# Patient Record
Sex: MALE | Race: WHITE | NOT HISPANIC OR LATINO | Employment: OTHER | URBAN - NONMETROPOLITAN AREA
[De-identification: names, ages, dates, MRNs, and addresses within clinical notes are randomized per-mention and may not be internally consistent; named-entity substitution may affect disease eponyms.]

---

## 2023-11-03 RX ORDER — ASPIRIN 81 MG/1
81 TABLET ORAL DAILY
COMMUNITY

## 2023-11-03 RX ORDER — ATORVASTATIN CALCIUM 80 MG/1
80 TABLET, FILM COATED ORAL DAILY
COMMUNITY
End: 2024-02-08 | Stop reason: ALTCHOICE

## 2023-11-03 RX ORDER — AMLODIPINE BESYLATE 10 MG/1
10 TABLET ORAL DAILY
COMMUNITY
End: 2024-02-08 | Stop reason: SDUPTHER

## 2023-11-03 RX ORDER — OMEPRAZOLE 20 MG/1
20 TABLET, DELAYED RELEASE ORAL
COMMUNITY

## 2023-11-03 RX ORDER — CYCLOBENZAPRINE HCL 10 MG
10 TABLET ORAL 3 TIMES DAILY PRN
COMMUNITY

## 2023-11-03 RX ORDER — TRAMADOL HYDROCHLORIDE 50 MG/1
50 TABLET ORAL EVERY 6 HOURS PRN
COMMUNITY

## 2023-11-03 RX ORDER — SULFASALAZINE 500 MG/1
1000 TABLET ORAL 4 TIMES DAILY
COMMUNITY

## 2023-11-03 RX ORDER — ALPRAZOLAM 0.25 MG/1
0.25 TABLET ORAL NIGHTLY PRN
COMMUNITY
End: 2023-11-06 | Stop reason: ALTCHOICE

## 2023-11-03 RX ORDER — METOPROLOL TARTRATE 25 MG/1
25 TABLET, FILM COATED ORAL 2 TIMES DAILY
COMMUNITY
End: 2024-01-04

## 2023-11-03 RX ORDER — DICYCLOMINE HYDROCHLORIDE 20 MG/1
20 TABLET ORAL
COMMUNITY

## 2023-11-03 RX ORDER — NITROGLYCERIN 0.4 MG/1
0.4 TABLET SUBLINGUAL EVERY 5 MIN PRN
COMMUNITY

## 2023-11-03 RX ORDER — IRBESARTAN 150 MG/1
150 TABLET ORAL NIGHTLY
COMMUNITY
End: 2024-02-08 | Stop reason: SDUPTHER

## 2023-11-03 RX ORDER — PREDNISONE 20 MG/1
20 TABLET ORAL DAILY
COMMUNITY

## 2023-11-03 RX ORDER — TAMSULOSIN HYDROCHLORIDE 0.4 MG/1
0.4 CAPSULE ORAL DAILY
COMMUNITY

## 2023-11-06 ENCOUNTER — OFFICE VISIT (OUTPATIENT)
Dept: CARDIOLOGY | Facility: CLINIC | Age: 63
End: 2023-11-06
Payer: MEDICARE

## 2023-11-06 VITALS
SYSTOLIC BLOOD PRESSURE: 134 MMHG | BODY MASS INDEX: 33.34 KG/M2 | HEART RATE: 58 BPM | DIASTOLIC BLOOD PRESSURE: 70 MMHG | HEIGHT: 68 IN | WEIGHT: 220 LBS

## 2023-11-06 DIAGNOSIS — Z98.890 S/P CAROTID ENDARTERECTOMY: ICD-10-CM

## 2023-11-06 DIAGNOSIS — I10 PRIMARY HYPERTENSION: ICD-10-CM

## 2023-11-06 DIAGNOSIS — E78.1 HYPERTRIGLYCERIDEMIA: ICD-10-CM

## 2023-11-06 DIAGNOSIS — I65.23 CAROTID STENOSIS, ASYMPTOMATIC, BILATERAL: ICD-10-CM

## 2023-11-06 DIAGNOSIS — I73.9 PVD (PERIPHERAL VASCULAR DISEASE) (CMS-HCC): ICD-10-CM

## 2023-11-06 DIAGNOSIS — I25.10 CORONARY ARTERY DISEASE, UNSPECIFIED VESSEL OR LESION TYPE, UNSPECIFIED WHETHER ANGINA PRESENT, UNSPECIFIED WHETHER NATIVE OR TRANSPLANTED HEART: Primary | ICD-10-CM

## 2023-11-06 DIAGNOSIS — Z95.5 HISTORY OF CORONARY ARTERY STENT PLACEMENT: ICD-10-CM

## 2023-11-06 DIAGNOSIS — E78.5 HYPERLIPIDEMIA, UNSPECIFIED HYPERLIPIDEMIA TYPE: ICD-10-CM

## 2023-11-06 PROBLEM — Z79.01 LONG TERM CURRENT USE OF ANTICOAGULANT THERAPY: Status: ACTIVE | Noted: 2023-11-06

## 2023-11-06 PROCEDURE — 3078F DIAST BP <80 MM HG: CPT | Performed by: INTERNAL MEDICINE

## 2023-11-06 PROCEDURE — 3075F SYST BP GE 130 - 139MM HG: CPT | Performed by: INTERNAL MEDICINE

## 2023-11-06 PROCEDURE — 99214 OFFICE O/P EST MOD 30 MIN: CPT | Performed by: INTERNAL MEDICINE

## 2023-11-06 NOTE — PROGRESS NOTES
This is a 6-month follow-up visit, in patient with atherosclerotic native vessel coronary artery disease, multivessel stenting, right carotid endarterectomy, inflammatory arthritis, and multiple risk factors which are detailed below.    Subjective :     Interval review of systems is negative for chest discomfort pressure tightness heaviness palpitations lightheadedness orthopnea paroxysmal nocturnal dyspnea dependent edema or claudication TIA or CVA type symptoms or bleeding diathesis    Remains quite active, goes hunting, and we discussed precautions  Nocturnal muscle cramps affecting both calf areas.  Denies claudication .      History so Far :  1. Symptoms concerning for class III angina pectoris, exertional in nature, however there is an increase in the frequency of symptoms, they are coming on with less activity.  2. Atherosclerotic native vessel coronary artery disease-PCI and stenting of the left anterior descending artery in the proximal mid segment was performed in 2014, patient was noted to be tubular, 3.5 x 28 mm Promus Premier stent was deployed.  3. PCI and stenting of the major obtuse marginal branch of the left circumflex artery was performed, 3.5 x 23 mm Xience expedition stent.  4. LVEF was normal in 2014 estimated LVEF 65% no mitral regurgitation or aortic stenosis  5. RCA was either nondominant or of mixed dominance, diffuse 50 to 70% disease in the proximal third 80 to 90% tubular disease in the mid third, distal RCA supplies a small amount of myocardium, in the distribution of the PDA, the actual PDA was felt to arise from the left circumflex system. RCA is being managed medically.  6. Hyperlipidemia  7. Status post right carotid endarterectomy  8. Glucose intolerance/prediabetes, hemoglobin A1c January 2023 6.0  9. Sinus bradycardia, incomplete right bundle branch block  10. Rheumatoid arthritis on Humira and as needed prednisone  11. Increased to BMI  12. Lexiscan Myoview 2017 was reported to  be normal  13. GERD, quiescent on prophylaxis.  14. Rheumatoid arthritis on as needed prednisone and Humira-chronic inflammatory disease  15. BPH, on tamsulosin.  16. Left heart catheterization February 2023-mixed dominance, large LAD that curves around the left ventricular apex, patent previous stent in the proximal LAD, with less than 20% narrowing, mid LAD 40% stenosis, several diagonal branches with less than 20% stenosis. Large codominant left circumflex artery moderate diffuse calcification healed plaque in the proximal vessel with eccentric 40 to 50% smooth stenosis first major obtuse marginal branch previously deployed stent in its mid segment, 99% stenosis starting at the distal end of the stent with SERGIO-3 distal flow, at the origin of this major obtuse marginal branch and immediately distal to it there is smooth 60% stenosis in the AV groove left circumflex artery distal to that distal branches and terminal circumflex have 20% stenosis RCA codominant proximal vessel 80% mid vessel 100% distal RCA fills by grade 3 collaterals from the left system LVEDP 5 mmHg no systolic gradient across the aortic valve, LVEF 50% no appreciable mitral regurgitation RCA occlusion is now total, prior angiography several years ago severe proximal vessel disease was noted.  17. Patient underwent PCI and stenting of the obtuse marginal branch of the left circumflex artery. 3.5 x 15 mm resolute [place] drug-eluting stent was deployed, and postdilated with a 3.5 mm noncompliant balloon. Angio-Seal hemostatic device was used.  18. Carotid ultrasound June 2023-no hemodynamically significant residual or recurrent stenosis is noted in the right internal carotid artery, 50 to 69% stenosis seen in the left extracranial internal carotid artery but closer to the lower end of the spectrum patent vertebral arteries with bilateral antegrade flow although the right vertebral artery waveform is blunted.  19.  Cannot rule out peripheral vascular  disease   20.  Nocturnal muscle cramps, differential diagnosis reviewed  21.  Ulcerative colitis    Objective      Wt Readings from Last 3 Encounters:   11/06/23 99.8 kg (220 lb)   06/12/23 101 kg (223 lb)   03/13/23 103 kg (226 lb)            Physical Exam:    GENERAL APPEARANCE: in no acute distress.  CHEST: Symmetric and non-tender.  INTEGUMENT: Skin warm and dry  HEENT: No gross abnormalities identified.No pallor or scleral icterus.  NECK: Supple, no JVD, no bruit.  Right carotid endarterectomy scar  NEURO/PSHCY: Alert and oriented x3; appropriate behavior and responses and responses  LUNGS: Clear to auscultation bilaterally; normal respiratory effort.  HEART: Rate and rhythm regular with no evident murmur; no gallop appreciated.   ABDOMEN: Soft, non tender.  MUSCULOSKELETAL: No gross deformities.  EXTREMITIES: Warm  There is no edema noted.  Dorsalis pedis is strong in the right foot posterior tibial and dorsalis pedis are probably 1+ out of 4 plus in both feet feet are cool to touch no dependent rubor    Meds:  Current Outpatient Medications   Medication Instructions    adalimumab (HUMIRA,CF, PEDI CROHNS STARTER SUBQ) subcutaneous, As directed    amLODIPine (NORVASC) 10 mg, oral, Daily    aspirin 81 mg, oral, Daily    atorvastatin (LIPITOR) 80 mg, oral, Daily    cyclobenzaprine (FLEXERIL) 10 mg, oral, 3 times daily PRN    dicyclomine (BENTYL) 20 mg, oral, 4 times daily before meals and nightly    irbesartan (AVAPRO) 150 mg, oral, Nightly    metoprolol tartrate (LOPRESSOR) 25 mg, oral, 2 times daily    nitroglycerin (NITROSTAT) 0.4 mg, sublingual, Every 5 min PRN    omeprazole OTC (PRILOSEC OTC) 20 mg, oral, 2 times daily before meals, Do not crush, chew, or split.    predniSONE (DELTASONE) 20 mg, oral, Daily, As directed    sulfaSALAzine (AZULFIDINE) 1,000 mg, oral, 4 times daily    tamsulosin (FLOMAX) 0.4 mg, oral, Daily    ticagrelor (BRILINTA) 90 mg, oral, 2 times daily    traMADol (ULTRAM) 50 mg, oral,  Every 6 hours PRN          No Known Allergies    Reviewed CBC lipid profile TSH basic metabolic profile and comprehensive profile from July 2023.  Reviewed hemoglobin A1c which is 6.2, no symptoms of hypoglycemia.  Triglycerides are elevated at 322 LDL 79 GFR greater than 60 glucose 127 potassium 4.4 liver enzymes are normal      Problem List:    Patient Active Problem List    Diagnosis Date Noted    Primary hypertension 11/06/2023    CAD (coronary artery disease) 11/06/2023    Hyperlipemia 11/06/2023    Hypertriglyceridemia 11/06/2023    Long term current use of anticoagulant therapy 11/06/2023    S/P carotid endarterectomy 11/06/2023    History of coronary artery stent placement 11/06/2023                 Assessment:  1.Primary hypertension-at target  2.  Hyperlipidemia-now characterized by hypertriglyceridemia  3.  Diabetes mellitus-hemoglobin A1c excellent, no symptoms of hypoglycemia  4.  History of multivessel PCI and stenting, current use of antiplatelet therapy, patient does complain of excessive bruising, I would like however for him to stay on dual antiplatelet therapy at this time based on his coronary anatomy hemoglobin hematocrit and platelets are normal  5.  He was supposed to get a carotid ultrasound last visit, I do not believe it was done.  6.  Patient is not complaining of angina or anginal equivalent, his shortness of breath is also much improved.  7.  Some evidence of peripheral vascular disease on examination, patient with diabetes and vascular disease elsewhere  8.  Nocturnal muscle cramps differential diagnosis includes peripheral vascular disease.  9.  Ulcerative colitis-quiescent  10.  Patient is taking 20 mg of prednisone as needed aches and pains, defer to primary       Recommendations:  1.  Suggested that patient drink 2 to 3 ounces of tonic water prior to bedtime  2.  PVR rest and exercise  3.  Carotid ultrasound  4.  Fish oil 1000 mg p.o. twice daily  5.  Follow-up in 3 months or sooner  if interval problems arise  6.  Stay on dual antiplatelet therapy for now  .      Follow up : 3 months      Dorothy Merchant MD

## 2023-11-18 ENCOUNTER — HOSPITAL ENCOUNTER (OUTPATIENT)
Dept: CARDIOLOGY | Facility: CLINIC | Age: 63
Discharge: HOME | End: 2023-11-18
Payer: MEDICARE

## 2023-11-18 DIAGNOSIS — Z98.890 S/P CAROTID ENDARTERECTOMY: ICD-10-CM

## 2023-11-18 DIAGNOSIS — I65.23 CAROTID STENOSIS, ASYMPTOMATIC, BILATERAL: ICD-10-CM

## 2023-11-18 PROCEDURE — 93880 EXTRACRANIAL BILAT STUDY: CPT | Performed by: INTERNAL MEDICINE

## 2023-11-18 PROCEDURE — 93880 EXTRACRANIAL BILAT STUDY: CPT

## 2023-11-30 ENCOUNTER — CLINICAL SUPPORT (OUTPATIENT)
Dept: CARDIOLOGY | Facility: CLINIC | Age: 63
End: 2023-11-30
Payer: MEDICARE

## 2023-11-30 DIAGNOSIS — I73.9 PVD (PERIPHERAL VASCULAR DISEASE) (CMS-HCC): ICD-10-CM

## 2023-11-30 NOTE — PROGRESS NOTES
Results:  Right Brachial 0.74 DP 0.83 PT 0.91  Left Brachial 1.00 DP 1.01 PT 0.86    SYMPTOM    1.  Do you ever have pain, tightness, tiredness or burning in the buttocks, hips, thighs, calves or feet while walking or exercising? NO      2.  Do you ever experience heaviness, or weakness with standing or walking? YES    3.  Have you ever experienced sores or ulcers on your feet or legs which seem to heal slowly? NO    4.  Do you ever appear limited in the distance you can walk due to leg weakness, tiredness, pain or burning?  YES    5.  Do you ever experience coldness in your legs or in your feet? [YES    6.  Do you ever experience pain, cramping or discomfort in your legs, feet or toes at night? YES    7.  Have you experienced hair loss on your feet or legs?  NO    8.  Inability to take blood pressure in your arm or leg? NO

## 2023-12-07 ENCOUNTER — APPOINTMENT (OUTPATIENT)
Dept: CARDIOLOGY | Facility: CLINIC | Age: 63
End: 2023-12-07
Payer: MEDICARE

## 2024-01-10 DIAGNOSIS — I25.10 CORONARY ARTERY DISEASE, UNSPECIFIED VESSEL OR LESION TYPE, UNSPECIFIED WHETHER ANGINA PRESENT, UNSPECIFIED WHETHER NATIVE OR TRANSPLANTED HEART: ICD-10-CM

## 2024-01-11 RX ORDER — TICAGRELOR 90 MG/1
90 TABLET ORAL 2 TIMES DAILY
Qty: 180 TABLET | Refills: 0 | Status: SHIPPED | OUTPATIENT
Start: 2024-01-11 | End: 2024-02-08 | Stop reason: SDUPTHER

## 2024-02-08 ENCOUNTER — OFFICE VISIT (OUTPATIENT)
Dept: CARDIOLOGY | Facility: CLINIC | Age: 64
End: 2024-02-08
Payer: MEDICARE

## 2024-02-08 VITALS
HEIGHT: 66 IN | WEIGHT: 220.4 LBS | DIASTOLIC BLOOD PRESSURE: 90 MMHG | HEART RATE: 56 BPM | SYSTOLIC BLOOD PRESSURE: 110 MMHG | BODY MASS INDEX: 35.42 KG/M2

## 2024-02-08 DIAGNOSIS — Z95.5 HISTORY OF CORONARY ARTERY STENT PLACEMENT: ICD-10-CM

## 2024-02-08 DIAGNOSIS — Z48.812 POSTOP CAROTID ENDARTERECTOMY SURVEILLANCE, ENCOUNTER FOR: ICD-10-CM

## 2024-02-08 DIAGNOSIS — I10 PRIMARY HYPERTENSION: ICD-10-CM

## 2024-02-08 DIAGNOSIS — I25.10 CORONARY ARTERY DISEASE, UNSPECIFIED VESSEL OR LESION TYPE, UNSPECIFIED WHETHER ANGINA PRESENT, UNSPECIFIED WHETHER NATIVE OR TRANSPLANTED HEART: ICD-10-CM

## 2024-02-08 DIAGNOSIS — E78.1 HYPERTRIGLYCERIDEMIA: ICD-10-CM

## 2024-02-08 DIAGNOSIS — Z98.890 S/P CAROTID ENDARTERECTOMY: Primary | ICD-10-CM

## 2024-02-08 DIAGNOSIS — I77.9 BILATERAL CAROTID ARTERY DISEASE, UNSPECIFIED TYPE (CMS-HCC): ICD-10-CM

## 2024-02-08 PROBLEM — Z71.2 ENCOUNTER TO DISCUSS TEST RESULTS: Status: ACTIVE | Noted: 2024-02-08

## 2024-02-08 PROCEDURE — 99214 OFFICE O/P EST MOD 30 MIN: CPT | Performed by: INTERNAL MEDICINE

## 2024-02-08 PROCEDURE — 3080F DIAST BP >= 90 MM HG: CPT | Performed by: INTERNAL MEDICINE

## 2024-02-08 PROCEDURE — 3074F SYST BP LT 130 MM HG: CPT | Performed by: INTERNAL MEDICINE

## 2024-02-08 PROCEDURE — 1036F TOBACCO NON-USER: CPT | Performed by: INTERNAL MEDICINE

## 2024-02-08 RX ORDER — ROSUVASTATIN CALCIUM 40 MG/1
40 TABLET, COATED ORAL DAILY
Qty: 90 TABLET | Refills: 2 | Status: SHIPPED | OUTPATIENT
Start: 2024-02-08 | End: 2025-02-07

## 2024-02-08 RX ORDER — METOPROLOL TARTRATE 25 MG/1
25 TABLET, FILM COATED ORAL 2 TIMES DAILY
Qty: 180 TABLET | Refills: 2 | Status: SHIPPED | OUTPATIENT
Start: 2024-02-08 | End: 2025-02-07

## 2024-02-08 RX ORDER — IRBESARTAN 150 MG/1
150 TABLET ORAL NIGHTLY
Qty: 90 TABLET | Refills: 2 | Status: SHIPPED | OUTPATIENT
Start: 2024-02-08 | End: 2025-02-07

## 2024-02-08 RX ORDER — AMLODIPINE BESYLATE 10 MG/1
10 TABLET ORAL DAILY
Qty: 90 TABLET | Refills: 2 | Status: SHIPPED | OUTPATIENT
Start: 2024-02-08 | End: 2025-02-07

## 2024-02-08 NOTE — LETTER
February 8, 2024     Julio Cesar Casillas DO  [address] [facility] [address]    Patient: Joby Rodrigues   YOB: 1960   Date of Visit: 2/8/2024       Dear Dr. Julio Cesar Casillas DO:    Thank you for referring Joby Rodrigues to me for evaluation. Below are my notes for this consultation.  If you have questions, please do not hesitate to call me. I look forward to following your patient along with you.       Sincerely,     Dorothy Merchant MD      CC: No Recipients  ______________________________________________________________________________________    63-year-old with multiple cardiac risk factors and coronary artery disease and cerebrovascular disease, was most recently seen in November 2023 and presents for follow-up.  Since his last visit he has not had evaluation for peripheral vascular disease as well as carotid ultrasound.    Subjective :   No chest pressure tightness heaviness or palpitations  Wants to know if he can go off the Brilinta.  Just completed 1 year post PCI and TOSHA  Has extensive atherosclerotic coronary artery disease  Complains of diffuse aches and pains, differential diagnosis does include side effect or myalgia related to atorvastatin.  Patient is also on prednisone which helps.  No muscle tenderness  No falls  Did not bring medications bottles or list.        History so Far :  1. Symptoms concerning for class III angina pectoris, exertional in nature, however there is an increase in the frequency of symptoms, they are coming on with less activity.  2. Atherosclerotic native vessel coronary artery disease-PCI and stenting of the left anterior descending artery in the proximal mid segment was performed in 2014, patient was noted to be tubular, 3.5 x 28 mm Promus Premier stent was deployed.  3. PCI and stenting of the major obtuse marginal branch of the left circumflex artery was performed, 3.5 x 23 mm Xience expedition stent.  4. LVEF was normal in 2014 estimated LVEF 65% no  mitral regurgitation or aortic stenosis  5. RCA was either nondominant or of mixed dominance, diffuse 50 to 70% disease in the proximal third 80 to 90% tubular disease in the mid third, distal RCA supplies a small amount of myocardium, in the distribution of the PDA, the actual PDA was felt to arise from the left circumflex system. RCA is being managed medically.  6. Hyperlipidemia  7. Status post right carotid endarterectomy  8. Glucose intolerance/prediabetes, hemoglobin A1c January 2023 6.0  9. Sinus bradycardia, incomplete right bundle branch block  10. Rheumatoid arthritis on Humira and as needed prednisone  11. Increased to BMI  12. Lexiscan Myoview 2017 was reported to be normal  13. GERD, quiescent on prophylaxis.  14. Rheumatoid arthritis on as needed prednisone and Humira-chronic inflammatory disease  15. BPH, on tamsulosin.  16. Left heart catheterization February 2023-mixed dominance, large LAD that curves around the left ventricular apex, patent previous stent in the proximal LAD, with less than 20% narrowing, mid LAD 40% stenosis, several diagonal branches with less than 20% stenosis. Large codominant left circumflex artery moderate diffuse calcification healed plaque in the proximal vessel with eccentric 40 to 50% smooth stenosis first major obtuse marginal branch previously deployed stent in its mid segment, 99% stenosis starting at the distal end of the stent with SERGIO-3 distal flow, at the origin of this major obtuse marginal branch and immediately distal to it there is smooth 60% stenosis in the AV groove left circumflex artery distal to that distal branches and terminal circumflex have 20% stenosis RCA codominant proximal vessel 80% mid vessel 100% distal RCA fills by grade 3 collaterals from the left system LVEDP 5 mmHg no systolic gradient across the aortic valve, LVEF 50% no appreciable mitral regurgitation RCA occlusion is now total, prior angiography several years ago severe proximal vessel  "disease was noted.  17. Patient underwent PCI and stenting of the obtuse marginal branch of the left circumflex artery. 3.5 x 15 mm resolute [place] drug-eluting stent was deployed, and postdilated with a 3.5 mm noncompliant balloon. Angio-Seal hemostatic device was used.  18. Carotid ultrasound June 2023-no hemodynamically significant residual or recurrent stenosis is noted in the right internal carotid artery, 50 to 69% stenosis seen in the left extracranial internal carotid artery but closer to the lower end of the spectrum patent vertebral arteries with bilateral antegrade flow although the right vertebral artery waveform is blunted.  19.  Cannot rule out peripheral vascular disease however KYM November 2023-right DP 0.83 right PT 0.91 left PT 0.86 left DP 1.01   20.  Nocturnal muscle cramps, differential diagnosis reviewed, not complaining much of muscle cramping today  21.  Ulcerative colitis  13.  Carotid ultrasound 11/6/2023-50 to 69% stenosis right proximal internal carotid artery less than 50% stenosis left proximal internal carotid artery bilateral antegrade vertebral flow patent right carotid endarterectomy site following endarterectomy    Objective   Failed to redirect to the Timeline version of the [employer] SmartLink.   Wt Readings from Last 3 Encounters:   02/08/24 100 kg (220 lb 6.4 oz)   11/06/23 99.8 kg (220 lb)   06/12/23 101 kg (223 lb)        Visit Vitals  /90 (BP Location: Left arm, Patient Position: Sitting)   Pulse 56   Ht 1.676 m (5' 6\")   Wt 100 kg (220 lb 6.4 oz)   BMI 35.57 kg/m²   Smoking Status Former   BSA 2.16 m²            Physical Exam:    GENERAL APPEARANCE: in no acute distress.  CHEST: Symmetric and non-tender.  INTEGUMENT: Skin warm and dry  HEENT: No gross abnormalities identified.No pallor or scleral icterus.  NECK: Supple, no JVD, no bruit.  Well-healed right Carotid endarterectomy scar  NEURO/PSHCY: Alert and oriented x3; appropriate behavior and responses and " responses  LUNGS: Clear to auscultation bilaterally; normal respiratory effort.  HEART: Rate and rhythm regular with no evident murmur; no gallop appreciated.   ABDOMEN: Soft, non tender.  MUSCULOSKELETAL: No gross deformities.  EXTREMITIES: Warm  There is no edema noted.    Meds:  Current Outpatient Medications   Medication Instructions   • adalimumab (HUMIRA,CF, PEDI CROHNS STARTER SUBQ) subcutaneous, As directed   • amLODIPine (NORVASC) 10 mg, oral, Daily   • aspirin 81 mg, oral, Daily   • atorvastatin (LIPITOR) 80 mg, oral, Daily   • Brilinta 90 mg, oral, 2 times daily   • cyclobenzaprine (FLEXERIL) 10 mg, oral, 3 times daily PRN   • dicyclomine (BENTYL) 20 mg, oral, 4 times daily before meals and nightly   • irbesartan (AVAPRO) 150 mg, oral, Nightly   • metoprolol tartrate (LOPRESSOR) 25 mg, oral, 2 times daily   • nitroglycerin (NITROSTAT) 0.4 mg, sublingual, Every 5 min PRN   • omeprazole OTC (PRILOSEC OTC) 20 mg, oral, 2 times daily before meals, Do not crush, chew, or split.   • predniSONE (DELTASONE) 20 mg, oral, Daily, As needed   • sulfaSALAzine (AZULFIDINE) 1,000 mg, oral, 4 times daily   • tamsulosin (FLOMAX) 0.4 mg, oral, Daily   • traMADol (ULTRAM) 50 mg, oral, Every 6 hours PRN          No Known Allergies    LABS:    Reviewed laboratory data to include CBC, comprehensive profile, basic metabolic profile, hemoglobin A1c and lipid profile from January 2024.  GFR is greater than 60 CBC is within normal limits TSH is normal at 3.11, HDL is 36,   , Total cholesterol 180.    Patient Active Problem List    Diagnosis Date Noted   • Encounter to discuss test results 02/08/2024   • Bilateral carotid artery disease (CMS/HCC) 02/08/2024   • Primary hypertension 11/06/2023   • CAD (coronary artery disease) 11/06/2023   • Hyperlipemia 11/06/2023   • Hypertriglyceridemia 11/06/2023   • Long term current use of anticoagulant therapy 11/06/2023   • S/P carotid endarterectomy 11/06/2023   • History of coronary  artery stent placement 11/06/2023                 Assessment:    1. S/P carotid endarterectomy        2. Coronary artery disease, unspecified vessel or lesion type, unspecified whether angina present, unspecified whether native or transplanted heart  Follow Up In Cardiology      3. Primary hypertension        4. Hypertriglyceridemia        5. History of coronary artery stent placement        6. Bilateral carotid artery disease, unspecified type (CMS/HCC)        Clinical discussion:  Patient is without angina or anginal equivalent  Blood pressure is at target  Lipids could be better, LDL goal is 70 or below, currently on maximum dose atorvastatin  There is a question as to  whether atorvastatin is causing some of his myalgia type symptoms  Reviewed results of carotid ultrasound and evaluation of PVD    Recommendations:  1.  Atorvastatin holiday for a week, patient should report if his myalgia improves tremendously, in which case we will document intolerance to the medicine  2.  Continue dual antiplatelet therapy for now  3.  After a week, switch to rosuvastatin 40 mg daily, if there is intolerance to rosuvastatin then we have to switch to Leqvio  4.  Follow-up with me in 6 months  5.  Lipid profile and liver enzymes in 3 to 4 months  6.  Patient was reminded that he should be bringing in his medication bottles or accurate medication list at every visit in order to get refills.  Follow up : 6 months        Provider Attestation - Scribe documentation    All medical record entries made by the Scribe were at my direction and personally dictated by me. I have reviewed the chart and agree that the record accurately reflects my personal performance of the history, physical exam, discussion and plan.     Scribe Attestation  By signing my name below, IRhianna LPN, Scribe   attest that this documentation has been prepared under the direction and in the presence of Dorothy Merchant MD.

## 2024-02-08 NOTE — PROGRESS NOTES
63-year-old with multiple cardiac risk factors and coronary artery disease and cerebrovascular disease, was most recently seen in November 2023 and presents for follow-up.  Since his last visit he has not had evaluation for peripheral vascular disease as well as carotid ultrasound.    Subjective :   No chest pressure tightness heaviness or palpitations  Wants to know if he can go off the Brilinta.  Just completed 1 year post PCI and TOSHA  Has extensive atherosclerotic coronary artery disease  Complains of diffuse aches and pains, differential diagnosis does include side effect or myalgia related to atorvastatin.  Patient is also on prednisone which helps.  No muscle tenderness  No falls  Did not bring medications bottles or list.        History so Far :  1. Symptoms concerning for class III angina pectoris, exertional in nature, however there is an increase in the frequency of symptoms, they are coming on with less activity.  2. Atherosclerotic native vessel coronary artery disease-PCI and stenting of the left anterior descending artery in the proximal mid segment was performed in 2014, patient was noted to be tubular, 3.5 x 28 mm Promus Premier stent was deployed.  3. PCI and stenting of the major obtuse marginal branch of the left circumflex artery was performed, 3.5 x 23 mm Xience expedition stent.  4. LVEF was normal in 2014 estimated LVEF 65% no mitral regurgitation or aortic stenosis  5. RCA was either nondominant or of mixed dominance, diffuse 50 to 70% disease in the proximal third 80 to 90% tubular disease in the mid third, distal RCA supplies a small amount of myocardium, in the distribution of the PDA, the actual PDA was felt to arise from the left circumflex system. RCA is being managed medically.  6. Hyperlipidemia  7. Status post right carotid endarterectomy  8. Glucose intolerance/prediabetes, hemoglobin A1c January 2023 6.0  9. Sinus bradycardia, incomplete right bundle branch block  10. Rheumatoid  arthritis on Humira and as needed prednisone  11. Increased to BMI  12. Lexiscan Myoview 2017 was reported to be normal  13. GERD, quiescent on prophylaxis.  14. Rheumatoid arthritis on as needed prednisone and Humira-chronic inflammatory disease  15. BPH, on tamsulosin.  16. Left heart catheterization February 2023-mixed dominance, large LAD that curves around the left ventricular apex, patent previous stent in the proximal LAD, with less than 20% narrowing, mid LAD 40% stenosis, several diagonal branches with less than 20% stenosis. Large codominant left circumflex artery moderate diffuse calcification healed plaque in the proximal vessel with eccentric 40 to 50% smooth stenosis first major obtuse marginal branch previously deployed stent in its mid segment, 99% stenosis starting at the distal end of the stent with SERGIO-3 distal flow, at the origin of this major obtuse marginal branch and immediately distal to it there is smooth 60% stenosis in the AV groove left circumflex artery distal to that distal branches and terminal circumflex have 20% stenosis RCA codominant proximal vessel 80% mid vessel 100% distal RCA fills by grade 3 collaterals from the left system LVEDP 5 mmHg no systolic gradient across the aortic valve, LVEF 50% no appreciable mitral regurgitation RCA occlusion is now total, prior angiography several years ago severe proximal vessel disease was noted.  17. Patient underwent PCI and stenting of the obtuse marginal branch of the left circumflex artery. 3.5 x 15 mm resolute [place] drug-eluting stent was deployed, and postdilated with a 3.5 mm noncompliant balloon. Angio-Seal hemostatic device was used.  18. Carotid ultrasound June 2023-no hemodynamically significant residual or recurrent stenosis is noted in the right internal carotid artery, 50 to 69% stenosis seen in the left extracranial internal carotid artery but closer to the lower end of the spectrum patent vertebral arteries with bilateral  "antegrade flow although the right vertebral artery waveform is blunted.  19.  Cannot rule out peripheral vascular disease however KYM November 2023-right DP 0.83 right PT 0.91 left PT 0.86 left DP 1.01   20.  Nocturnal muscle cramps, differential diagnosis reviewed, not complaining much of muscle cramping today  21.  Ulcerative colitis  13.  Carotid ultrasound 11/6/2023-50 to 69% stenosis right proximal internal carotid artery less than 50% stenosis left proximal internal carotid artery bilateral antegrade vertebral flow patent right carotid endarterectomy site following endarterectomy    Objective   Failed to redirect to the Timeline version of the REVFS SmartLink.   Wt Readings from Last 3 Encounters:   02/08/24 100 kg (220 lb 6.4 oz)   11/06/23 99.8 kg (220 lb)   06/12/23 101 kg (223 lb)        Visit Vitals  /90 (BP Location: Left arm, Patient Position: Sitting)   Pulse 56   Ht 1.676 m (5' 6\")   Wt 100 kg (220 lb 6.4 oz)   BMI 35.57 kg/m²   Smoking Status Former   BSA 2.16 m²            Physical Exam:    GENERAL APPEARANCE: in no acute distress.  CHEST: Symmetric and non-tender.  INTEGUMENT: Skin warm and dry  HEENT: No gross abnormalities identified.No pallor or scleral icterus.  NECK: Supple, no JVD, no bruit.  Well-healed right Carotid endarterectomy scar  NEURO/PSHCY: Alert and oriented x3; appropriate behavior and responses and responses  LUNGS: Clear to auscultation bilaterally; normal respiratory effort.  HEART: Rate and rhythm regular with no evident murmur; no gallop appreciated.   ABDOMEN: Soft, non tender.  MUSCULOSKELETAL: No gross deformities.  EXTREMITIES: Warm  There is no edema noted.    Meds:  Current Outpatient Medications   Medication Instructions    adalimumab (HUMIRA,CF, PEDI CROHNS STARTER SUBQ) subcutaneous, As directed    amLODIPine (NORVASC) 10 mg, oral, Daily    aspirin 81 mg, oral, Daily    atorvastatin (LIPITOR) 80 mg, oral, Daily    Brilinta 90 mg, oral, 2 times daily    " cyclobenzaprine (FLEXERIL) 10 mg, oral, 3 times daily PRN    dicyclomine (BENTYL) 20 mg, oral, 4 times daily before meals and nightly    irbesartan (AVAPRO) 150 mg, oral, Nightly    metoprolol tartrate (LOPRESSOR) 25 mg, oral, 2 times daily    nitroglycerin (NITROSTAT) 0.4 mg, sublingual, Every 5 min PRN    omeprazole OTC (PRILOSEC OTC) 20 mg, oral, 2 times daily before meals, Do not crush, chew, or split.    predniSONE (DELTASONE) 20 mg, oral, Daily, As needed    sulfaSALAzine (AZULFIDINE) 1,000 mg, oral, 4 times daily    tamsulosin (FLOMAX) 0.4 mg, oral, Daily    traMADol (ULTRAM) 50 mg, oral, Every 6 hours PRN          No Known Allergies    LABS:    Reviewed laboratory data to include CBC, comprehensive profile, basic metabolic profile, hemoglobin A1c and lipid profile from January 2024.  GFR is greater than 60 CBC is within normal limits TSH is normal at 3.11, HDL is 36,   , Total cholesterol 180.    Patient Active Problem List    Diagnosis Date Noted    Encounter to discuss test results 02/08/2024    Bilateral carotid artery disease (CMS/[facility]) 02/08/2024    Primary hypertension 11/06/2023    CAD (coronary artery disease) 11/06/2023    Hyperlipemia 11/06/2023    Hypertriglyceridemia 11/06/2023    Long term current use of anticoagulant therapy 11/06/2023    S/P carotid endarterectomy 11/06/2023    History of coronary artery stent placement 11/06/2023                 Assessment:    1. S/P carotid endarterectomy        2. Coronary artery disease, unspecified vessel or lesion type, unspecified whether angina present, unspecified whether native or transplanted heart  Follow Up In Cardiology      3. Primary hypertension        4. Hypertriglyceridemia        5. History of coronary artery stent placement        6. Bilateral carotid artery disease, unspecified type (CMS/[facility])        Clinical discussion:  Patient is without angina or anginal equivalent  Blood pressure is at target  Lipids could be better, LDL goal is  70 or below, currently on maximum dose atorvastatin  There is a question as to  whether atorvastatin is causing some of his myalgia type symptoms  Reviewed results of carotid ultrasound and evaluation of PVD    Recommendations:  1.  Atorvastatin holiday for a week, patient should report if his myalgia improves tremendously, in which case we will document intolerance to the medicine  2.  Continue dual antiplatelet therapy for now  3.  After a week, switch to rosuvastatin 40 mg daily, if there is intolerance to rosuvastatin then we have to switch to Leqvio  4.  Follow-up with me in 6 months  5.  Lipid profile and liver enzymes in 3 to 4 months  6.  Patient was reminded that he should be bringing in his medication bottles or accurate medication list at every visit in order to get refills.  Follow up : 6 months        Provider Attestation - Scribe documentation    All medical record entries made by the Scribe were at my direction and personally dictated by me. I have reviewed the chart and agree that the record accurately reflects my personal performance of the history, physical exam, discussion and plan.     Scribe Attestation  By signing my name below, I, Erika Moctezuma LPN   attest that this documentation has been prepared under the direction and in the presence of Dorothy Merchant MD.

## 2024-08-13 DIAGNOSIS — I10 PRIMARY HYPERTENSION: ICD-10-CM

## 2024-08-13 RX ORDER — IRBESARTAN 150 MG/1
150 TABLET ORAL NIGHTLY
Qty: 90 TABLET | Refills: 0 | Status: SHIPPED | OUTPATIENT
Start: 2024-08-13 | End: 2024-11-11

## 2024-08-19 ENCOUNTER — APPOINTMENT (OUTPATIENT)
Dept: CARDIOLOGY | Facility: CLINIC | Age: 64
End: 2024-08-19
Payer: MEDICARE

## 2024-09-16 ENCOUNTER — HOSPITAL ENCOUNTER (OUTPATIENT)
Dept: CARDIOLOGY | Facility: CLINIC | Age: 64
Discharge: HOME | End: 2024-09-16
Payer: MEDICARE

## 2024-09-16 DIAGNOSIS — Z98.890 S/P CAROTID ENDARTERECTOMY: ICD-10-CM

## 2024-09-16 DIAGNOSIS — I25.10 CORONARY ARTERY DISEASE, UNSPECIFIED VESSEL OR LESION TYPE, UNSPECIFIED WHETHER ANGINA PRESENT, UNSPECIFIED WHETHER NATIVE OR TRANSPLANTED HEART: ICD-10-CM

## 2024-09-16 DIAGNOSIS — Z48.812 POSTOP CAROTID ENDARTERECTOMY SURVEILLANCE, ENCOUNTER FOR: ICD-10-CM

## 2024-09-16 DIAGNOSIS — I65.23 OCCLUSION AND STENOSIS OF BILATERAL CAROTID ARTERIES: ICD-10-CM

## 2024-09-16 DIAGNOSIS — I77.9 BILATERAL CAROTID ARTERY DISEASE, UNSPECIFIED TYPE (CMS-HCC): ICD-10-CM

## 2024-09-16 DIAGNOSIS — Z95.5 HISTORY OF CORONARY ARTERY STENT PLACEMENT: ICD-10-CM

## 2024-09-16 PROCEDURE — 93880 EXTRACRANIAL BILAT STUDY: CPT

## 2024-09-16 PROCEDURE — 93880 EXTRACRANIAL BILAT STUDY: CPT | Performed by: INTERNAL MEDICINE

## 2024-09-19 ENCOUNTER — APPOINTMENT (OUTPATIENT)
Dept: CARDIOLOGY | Facility: CLINIC | Age: 64
End: 2024-09-19
Payer: MEDICARE

## 2024-09-19 VITALS
WEIGHT: 221 LBS | BODY MASS INDEX: 33.49 KG/M2 | SYSTOLIC BLOOD PRESSURE: 118 MMHG | HEART RATE: 60 BPM | DIASTOLIC BLOOD PRESSURE: 70 MMHG | HEIGHT: 68 IN

## 2024-09-19 DIAGNOSIS — D64.89 ANEMIA DUE TO OTHER CAUSE, NOT CLASSIFIED: ICD-10-CM

## 2024-09-19 DIAGNOSIS — Z79.01 LONG TERM CURRENT USE OF ANTICOAGULANT THERAPY: ICD-10-CM

## 2024-09-19 DIAGNOSIS — I77.9 BILATERAL CAROTID ARTERY DISEASE, UNSPECIFIED TYPE (CMS-HCC): ICD-10-CM

## 2024-09-19 DIAGNOSIS — Z87.891 FORMER SMOKER: ICD-10-CM

## 2024-09-19 DIAGNOSIS — I10 PRIMARY HYPERTENSION: ICD-10-CM

## 2024-09-19 DIAGNOSIS — Z95.5 HISTORY OF CORONARY ARTERY STENT PLACEMENT: ICD-10-CM

## 2024-09-19 DIAGNOSIS — I25.10 CORONARY ARTERY DISEASE, UNSPECIFIED VESSEL OR LESION TYPE, UNSPECIFIED WHETHER ANGINA PRESENT, UNSPECIFIED WHETHER NATIVE OR TRANSPLANTED HEART: ICD-10-CM

## 2024-09-19 DIAGNOSIS — Z79.899 MEDICATION COURSE CHANGED: ICD-10-CM

## 2024-09-19 DIAGNOSIS — E78.1 HYPERTRIGLYCERIDEMIA: ICD-10-CM

## 2024-09-19 DIAGNOSIS — Z98.890 S/P CAROTID ENDARTERECTOMY: ICD-10-CM

## 2024-09-19 PROCEDURE — 3008F BODY MASS INDEX DOCD: CPT | Performed by: INTERNAL MEDICINE

## 2024-09-19 PROCEDURE — 99214 OFFICE O/P EST MOD 30 MIN: CPT | Performed by: INTERNAL MEDICINE

## 2024-09-19 PROCEDURE — 1160F RVW MEDS BY RX/DR IN RCRD: CPT | Performed by: INTERNAL MEDICINE

## 2024-09-19 PROCEDURE — 1159F MED LIST DOCD IN RCRD: CPT | Performed by: INTERNAL MEDICINE

## 2024-09-19 PROCEDURE — 3078F DIAST BP <80 MM HG: CPT | Performed by: INTERNAL MEDICINE

## 2024-09-19 PROCEDURE — 3074F SYST BP LT 130 MM HG: CPT | Performed by: INTERNAL MEDICINE

## 2024-09-19 RX ORDER — AMLODIPINE BESYLATE 10 MG/1
10 TABLET ORAL DAILY
Qty: 90 TABLET | Refills: 2 | Status: SHIPPED | OUTPATIENT
Start: 2024-09-19 | End: 2025-09-19

## 2024-09-19 RX ORDER — METFORMIN HYDROCHLORIDE 500 MG/1
1000 TABLET ORAL
COMMUNITY
Start: 2024-09-12

## 2024-09-19 RX ORDER — ROSUVASTATIN CALCIUM 40 MG/1
40 TABLET, COATED ORAL DAILY
Qty: 90 TABLET | Refills: 2 | Status: SHIPPED | OUTPATIENT
Start: 2024-09-19 | End: 2025-09-19

## 2024-09-19 RX ORDER — IRBESARTAN 150 MG/1
150 TABLET ORAL NIGHTLY
Qty: 90 TABLET | Refills: 2 | Status: SHIPPED | OUTPATIENT
Start: 2024-09-19 | End: 2024-09-20 | Stop reason: SDUPTHER

## 2024-09-19 RX ORDER — METOPROLOL TARTRATE 25 MG/1
25 TABLET, FILM COATED ORAL 2 TIMES DAILY
Qty: 180 TABLET | Refills: 2 | Status: SHIPPED | OUTPATIENT
Start: 2024-09-19 | End: 2025-09-19

## 2024-09-19 NOTE — PATIENT INSTRUCTIONS
Please bring all medicines, vitamins, and herbal supplements with you when you come to the office.    Prescriptions will not be filled unless you are compliant with your follow up appointments or have a follow up appointment scheduled as per instruction of your physician. Refills should be requested at the time of your visit.   BMI was above normal measurement. Current weight: 100 kg (221 lb)  Weight change since last visit (-) denotes wt loss 0.6 lbs   Weight loss needed to achieve BMI 25: 56.9 Lbs  Weight loss needed to achieve BMI 30: 24.1 Lbs  Advised to Increase physical activity.

## 2024-09-19 NOTE — PROGRESS NOTES
6-month follow-up visit.  Subjective :     Reports feeling well.  Interval review of systems is negative for chest discomfort pressure tightness heaviness palpitations lightheadedness orthopnea paroxysmal nocturnal dyspnea dependent edema or claudication TIA or CVA type symptoms or bleeding diathesis  Has not had any falls since last visit.  No symptoms of hypoglycemia.    History so Far :    1. Symptoms concerning for class III angina pectoris, exertional in nature, however there is an increase in the frequency of symptoms, they are coming on with less activity.  2. Atherosclerotic native vessel coronary artery disease-PCI and stenting of the left anterior descending artery in the proximal mid segment was performed in 2014, patient was noted to be tubular, 3.5 x 28 mm Promus Premier stent was deployed.  3. PCI and stenting of the major obtuse marginal branch of the left circumflex artery was performed, 3.5 x 23 mm Xience expedition stent.  4. LVEF was normal in 2014 estimated LVEF 65% no mitral regurgitation or aortic stenosis  5. RCA was either nondominant or of mixed dominance, diffuse 50 to 70% disease in the proximal third 80 to 90% tubular disease in the mid third, distal RCA supplies a small amount of myocardium, in the distribution of the PDA, the actual PDA was felt to arise from the left circumflex system. RCA is being managed medically.  6. Hyperlipidemia  7. Status post right carotid endarterectomy  8. Glucose intolerance/prediabetes, hemoglobin A1c January 2023 6.0  9. Sinus bradycardia, incomplete right bundle branch block  10. Rheumatoid arthritis on Humira and as needed prednisone  11. Increased to BMI  12. Lexiscan Myoview 2017 was reported to be normal  13. GERD, quiescent on prophylaxis.  14. Rheumatoid arthritis on as needed prednisone and Humira-chronic inflammatory disease  15. BPH, on tamsulosin.  16. Left heart catheterization February 2023-mixed dominance, large LAD that curves around the  left ventricular apex, patent previous stent in the proximal LAD, with less than 20% narrowing, mid LAD 40% stenosis, several diagonal branches with less than 20% stenosis. Large codominant left circumflex artery moderate diffuse calcification healed plaque in the proximal vessel with eccentric 40 to 50% smooth stenosis first major obtuse marginal branch previously deployed stent in its mid segment, 99% stenosis starting at the distal end of the stent with SERGIO-3 distal flow, at the origin of this major obtuse marginal branch and immediately distal to it there is smooth 60% stenosis in the AV groove left circumflex artery distal to that distal branches and terminal circumflex have 20% stenosis RCA codominant proximal vessel 80% mid vessel 100% distal RCA fills by grade 3 collaterals from the left system LVEDP 5 mmHg no systolic gradient across the aortic valve, LVEF 50% no appreciable mitral regurgitation RCA occlusion is now total, prior angiography several years ago severe proximal vessel disease was noted.  17. Patient underwent PCI and stenting of the obtuse marginal branch of the left circumflex artery. 3.5 x 15 mm resolute [place] drug-eluting stent was deployed, and postdilated with a 3.5 mm noncompliant balloon. Angio-Seal hemostatic device was used.  18. Carotid ultrasound June 2023-no hemodynamically significant residual or recurrent stenosis is noted in the right internal carotid artery, 50 to 69% stenosis seen in the left extracranial internal carotid artery but closer to the lower end of the spectrum patent vertebral arteries with bilateral antegrade flow although the right vertebral artery waveform is blunted.  19.  Cannot rule out peripheral vascular disease however KYM November 2023-right DP 0.83 right PT 0.91 left PT 0.86 left DP 1.01   20.  Nocturnal muscle cramps, differential diagnosis reviewed, not complaining much of muscle cramping today  21.  Ulcerative colitis  13.  Carotid ultrasound  "11/6/2023-50 to 69% stenosis right proximal internal carotid artery less than 50% stenosis left proximal internal carotid artery bilateral antegrade vertebral flow patent right carotid endarterectomy site following endarterectomy      Objective   Wt Readings from Last 3 Encounters:   09/19/24 100 kg (221 lb)   02/08/24 100 kg (220 lb 6.4 oz)   11/06/23 99.8 kg (220 lb)            Vitals:    09/19/24 1030   BP: 118/70   BP Location: Left arm   Patient Position: Sitting   Pulse: 60   Weight: 100 kg (221 lb)   Height: 1.727 m (5' 8\")                Physical Exam:    GENERAL APPEARANCE: in no acute distress.  CHEST: Symmetric and non-tender.  INTEGUMENT: Skin warm and dry  HEENT: No gross abnormalities identified.No pallor or scleral icterus.  NECK: Supple, no JVD, no bruit.  Right carotid endarterectomy scar is noted, well-healed  NEURO/PSHCY: Alert and oriented x3; appropriate behavior and responses and responses  LUNGS: Clear to auscultation bilaterally; normal respiratory effort.  HEART: Rate and rhythm regular with no evident murmur; no gallop appreciated.   ABDOMEN: Soft, non tender.  MUSCULOSKELETAL: No gross deformities.  EXTREMITIES: Warm  There is no edema noted.    Meds:  Current Outpatient Medications   Medication Instructions    adalimumab (HUMIRA,RADHA, PEDI CROHNS STARTER SUBQ) subcutaneous, As directed    amLODIPine (NORVASC) 10 mg, oral, Daily    cyclobenzaprine (FLEXERIL) 10 mg, oral, 3 times daily PRN    dicyclomine (BENTYL) 20 mg, oral, 4 times daily before meals and nightly    irbesartan (AVAPRO) 150 mg, oral, Nightly    metFORMIN (GLUCOPHAGE) 1,000 mg, oral, 2 times daily (morning and late afternoon)    metoprolol tartrate (LOPRESSOR) 25 mg, oral, 2 times daily    nitroglycerin (NITROSTAT) 0.4 mg, sublingual, Every 5 min PRN    omeprazole OTC (PRILOSEC OTC) 20 mg, oral, 2 times daily before meals, Do not crush, chew, or split.    predniSONE (DELTASONE) 20 mg, oral, Daily, As needed    rosuvastatin " (CRESTOR) 40 mg, oral, Daily    sulfaSALAzine (AZULFIDINE) 1,000 mg, oral, 4 times daily    ticagrelor (BRILINTA) 90 mg, oral, 2 times daily    traMADol (ULTRAM) 50 mg, oral, Every 6 hours PRN          No Known Allergies          LABS:    Reviewed laboratory data from August 2024 hemoglobin 11.8 hematocrit 35, which is slightly down compared to January 2024 MCV 89 platelet count [id number].  Sodium 137 potassium 4.3 BUN 29 creatinine 1.6 GFR 47.5 liver enzymes normal hemoglobin and hematocrit in February 2024 was 15 and 44.8 respectively hemoglobin A1c in February 2024 6.0 GFR in January was greater than 60 LDL cholesterol 105 in January    Patient Active Problem List    Diagnosis Date Noted    Other specified anemias 09/19/2024    Medication course changed 09/19/2024    BMI 33.0-33.9,adult 09/19/2024    Former smoker 09/19/2024    Encounter to discuss test results 02/08/2024    Bilateral carotid artery disease (CMS-[facility]) 02/08/2024    Primary hypertension 11/06/2023    CAD (coronary artery disease) 11/06/2023    Hyperlipemia 11/06/2023    Hypertriglyceridemia 11/06/2023    Long term current use of anticoagulant therapy 11/06/2023    S/P carotid endarterectomy 11/06/2023    History of coronary artery stent placement 11/06/2023                 Assessment:    1. Coronary artery disease, unspecified vessel or lesion type, unspecified whether angina present, unspecified whether native or transplanted heart  Follow Up In Cardiology      2. S/P carotid endarterectomy  Follow Up In Cardiology      3. Primary hypertension  Follow Up In Cardiology      4. Hypertriglyceridemia  Follow Up In Cardiology      5. History of coronary artery stent placement  Follow Up In Cardiology      6. Bilateral carotid artery disease, unspecified type (CMS-[facility])  Follow Up In Cardiology      7. Anemia due to other cause, not classified        8. Long term current use of anticoagulant therapy        9. Medication course changed        10. Former  smoker        The results of the patient's carotid ultrasound were not available at the time of his visit.  It was subsequently reviewed.  There was some progression of disease on the left side, patient is without symptoms, we will call him and repeat his carotid ultrasound in 6 months.  His laboratory data from August show a decline in his kidney function, the etiology of which is unclear, we will repeat basic metabolic profile.  This patient has aggressive vascular disease, LDL is not at goal on rosuvastatin 40 mg daily.  Will add fenofibrate 145 mg daily.  Recheck lipid profile in 3 to 4 months  Follow-up in 6 months after carotid ultrasound.  Patient was requesting down titration of antiplatelet therapy.  He is more than a year out of his intervention, we will discontinue the aspirin and continue the ticagrelor.  Follow up : 6 months        Provider Attestation - Scribe documentation    All medical record entries made by the Scribe were at my direction and personally dictated by me. I have reviewed the chart and agree that the record accurately reflects my personal performance of the history, physical exam, discussion and plan.     Scribe Attestation  By signing my name below, I, Rhianna WALLACE LPN  , Erika   attest that this documentation has been prepared under the direction and in the presence of Dorothy Merchant MD.

## 2024-09-19 NOTE — RESULT ENCOUNTER NOTE
Phoebe Hemphill , I was able to review the report of your carotid ultrasound.  On the left side there is slight progression of disease compared to the last ultrasound.    Lets repeat a carotid ultrasound in about 6 months.  It also came to my attention that your kidney function is slightly lower than before.  I am ordering repeat blood work.  Also adding a medicine called Tricor to your cholesterol medicine, to get the bad cholesterol down to 70, it is now in the low 100s.  If you develop any achy muscles please let us know.  Was good seeing you today.

## 2024-09-19 NOTE — LETTER
September 20, 2024     Juilo Cesar Casillas DO  [address] [facility] [address]    Patient: Joby Rodrigues   YOB: 1960   Date of Visit: 9/19/2024       Dear Dr. Julio Cesar Casillas DO:    Thank you for referring Joby Rodrigues to me for evaluation. Below are my notes for this consultation.  If you have questions, please do not hesitate to call me. I look forward to following your patient along with you.       Sincerely,     Dorothy Merchant MD      CC: No Recipients  ______________________________________________________________________________________      6-month follow-up visit.  Subjective :     Reports feeling well.  Interval review of systems is negative for chest discomfort pressure tightness heaviness palpitations lightheadedness orthopnea paroxysmal nocturnal dyspnea dependent edema or claudication TIA or CVA type symptoms or bleeding diathesis  Has not had any falls since last visit.  No symptoms of hypoglycemia.    History so Far :    1. Symptoms concerning for class III angina pectoris, exertional in nature, however there is an increase in the frequency of symptoms, they are coming on with less activity.  2. Atherosclerotic native vessel coronary artery disease-PCI and stenting of the left anterior descending artery in the proximal mid segment was performed in 2014, patient was noted to be tubular, 3.5 x 28 mm Promus Premier stent was deployed.  3. PCI and stenting of the major obtuse marginal branch of the left circumflex artery was performed, 3.5 x 23 mm Xience expedition stent.  4. LVEF was normal in 2014 estimated LVEF 65% no mitral regurgitation or aortic stenosis  5. RCA was either nondominant or of mixed dominance, diffuse 50 to 70% disease in the proximal third 80 to 90% tubular disease in the mid third, distal RCA supplies a small amount of myocardium, in the distribution of the PDA, the actual PDA was felt to arise from the left circumflex system. RCA is being managed medically.  6.  Hyperlipidemia  7. Status post right carotid endarterectomy  8. Glucose intolerance/prediabetes, hemoglobin A1c January 2023 6.0  9. Sinus bradycardia, incomplete right bundle branch block  10. Rheumatoid arthritis on Humira and as needed prednisone  11. Increased to BMI  12. Lexiscan Myoview 2017 was reported to be normal  13. GERD, quiescent on prophylaxis.  14. Rheumatoid arthritis on as needed prednisone and Humira-chronic inflammatory disease  15. BPH, on tamsulosin.  16. Left heart catheterization February 2023-mixed dominance, large LAD that curves around the left ventricular apex, patent previous stent in the proximal LAD, with less than 20% narrowing, mid LAD 40% stenosis, several diagonal branches with less than 20% stenosis. Large codominant left circumflex artery moderate diffuse calcification healed plaque in the proximal vessel with eccentric 40 to 50% smooth stenosis first major obtuse marginal branch previously deployed stent in its mid segment, 99% stenosis starting at the distal end of the stent with SERGIO-3 distal flow, at the origin of this major obtuse marginal branch and immediately distal to it there is smooth 60% stenosis in the AV groove left circumflex artery distal to that distal branches and terminal circumflex have 20% stenosis RCA codominant proximal vessel 80% mid vessel 100% distal RCA fills by grade 3 collaterals from the left system LVEDP 5 mmHg no systolic gradient across the aortic valve, LVEF 50% no appreciable mitral regurgitation RCA occlusion is now total, prior angiography several years ago severe proximal vessel disease was noted.  17. Patient underwent PCI and stenting of the obtuse marginal branch of the left circumflex artery. 3.5 x 15 mm resolute [place] drug-eluting stent was deployed, and postdilated with a 3.5 mm noncompliant balloon. Angio-Seal hemostatic device was used.  18. Carotid ultrasound June 2023-no hemodynamically significant residual or recurrent stenosis is  "noted in the right internal carotid artery, 50 to 69% stenosis seen in the left extracranial internal carotid artery but closer to the lower end of the spectrum patent vertebral arteries with bilateral antegrade flow although the right vertebral artery waveform is blunted.  19.  Cannot rule out peripheral vascular disease however KYM November 2023-right DP 0.83 right PT 0.91 left PT 0.86 left DP 1.01   20.  Nocturnal muscle cramps, differential diagnosis reviewed, not complaining much of muscle cramping today  21.  Ulcerative colitis  13.  Carotid ultrasound 11/6/2023-50 to 69% stenosis right proximal internal carotid artery less than 50% stenosis left proximal internal carotid artery bilateral antegrade vertebral flow patent right carotid endarterectomy site following endarterectomy      Objective   Wt Readings from Last 3 Encounters:   09/19/24 100 kg (221 lb)   02/08/24 100 kg (220 lb 6.4 oz)   11/06/23 99.8 kg (220 lb)            Vitals:    09/19/24 1030   BP: 118/70   BP Location: Left arm   Patient Position: Sitting   Pulse: 60   Weight: 100 kg (221 lb)   Height: 1.727 m (5' 8\")                Physical Exam:    GENERAL APPEARANCE: in no acute distress.  CHEST: Symmetric and non-tender.  INTEGUMENT: Skin warm and dry  HEENT: No gross abnormalities identified.No pallor or scleral icterus.  NECK: Supple, no JVD, no bruit.  Right carotid endarterectomy scar is noted, well-healed  NEURO/PSHCY: Alert and oriented x3; appropriate behavior and responses and responses  LUNGS: Clear to auscultation bilaterally; normal respiratory effort.  HEART: Rate and rhythm regular with no evident murmur; no gallop appreciated.   ABDOMEN: Soft, non tender.  MUSCULOSKELETAL: No gross deformities.  EXTREMITIES: Warm  There is no edema noted.    Meds:  Current Outpatient Medications   Medication Instructions   • adalimumab (HUMIRA,CF, PEDI CROHNS STARTER SUBQ) subcutaneous, As directed   • amLODIPine (NORVASC) 10 mg, oral, Daily   • " cyclobenzaprine (FLEXERIL) 10 mg, oral, 3 times daily PRN   • dicyclomine (BENTYL) 20 mg, oral, 4 times daily before meals and nightly   • irbesartan (AVAPRO) 150 mg, oral, Nightly   • metFORMIN (GLUCOPHAGE) 1,000 mg, oral, 2 times daily (morning and late afternoon)   • metoprolol tartrate (LOPRESSOR) 25 mg, oral, 2 times daily   • nitroglycerin (NITROSTAT) 0.4 mg, sublingual, Every 5 min PRN   • omeprazole OTC (PRILOSEC OTC) 20 mg, oral, 2 times daily before meals, Do not crush, chew, or split.   • predniSONE (DELTASONE) 20 mg, oral, Daily, As needed   • rosuvastatin (CRESTOR) 40 mg, oral, Daily   • sulfaSALAzine (AZULFIDINE) 1,000 mg, oral, 4 times daily   • ticagrelor (BRILINTA) 90 mg, oral, 2 times daily   • traMADol (ULTRAM) 50 mg, oral, Every 6 hours PRN          No Known Allergies          LABS:    Reviewed laboratory data from August 2024 hemoglobin 11.8 hematocrit 35, which is slightly down compared to January 2024 MCV 89 platelet count [id number].  Sodium 137 potassium 4.3 BUN 29 creatinine 1.6 GFR 47.5 liver enzymes normal hemoglobin and hematocrit in February 2024 was 15 and 44.8 respectively hemoglobin A1c in February 2024 6.0 GFR in January was greater than 60 LDL cholesterol 105 in January    Patient Active Problem List    Diagnosis Date Noted   • Other specified anemias 09/19/2024   • Medication course changed 09/19/2024   • BMI 33.0-33.9,adult 09/19/2024   • Former smoker 09/19/2024   • Encounter to discuss test results 02/08/2024   • Bilateral carotid artery disease (CMS-HCC) 02/08/2024   • Primary hypertension 11/06/2023   • CAD (coronary artery disease) 11/06/2023   • Hyperlipemia 11/06/2023   • Hypertriglyceridemia 11/06/2023   • Long term current use of anticoagulant therapy 11/06/2023   • S/P carotid endarterectomy 11/06/2023   • History of coronary artery stent placement 11/06/2023                 Assessment:    1. Coronary artery disease, unspecified vessel or lesion type, unspecified whether  angina present, unspecified whether native or transplanted heart  Follow Up In Cardiology      2. S/P carotid endarterectomy  Follow Up In Cardiology      3. Primary hypertension  Follow Up In Cardiology      4. Hypertriglyceridemia  Follow Up In Cardiology      5. History of coronary artery stent placement  Follow Up In Cardiology      6. Bilateral carotid artery disease, unspecified type (CMS-HCC)  Follow Up In Cardiology      7. Anemia due to other cause, not classified        8. Long term current use of anticoagulant therapy        9. Medication course changed        10. Former smoker        The results of the patient's carotid ultrasound were not available at the time of his visit.  It was subsequently reviewed.  There was some progression of disease on the left side, patient is without symptoms, we will call him and repeat his carotid ultrasound in 6 months.  His laboratory data from August show a decline in his kidney function, the etiology of which is unclear, we will repeat basic metabolic profile.  This patient has aggressive vascular disease, LDL is not at goal on rosuvastatin 40 mg daily.  Will add fenofibrate 145 mg daily.  Recheck lipid profile in 3 to 4 months  Follow-up in 6 months after carotid ultrasound.  Patient was requesting down titration of antiplatelet therapy.  He is more than a year out of his intervention, we will discontinue the aspirin and continue the ticagrelor.  Follow up : 6 months        Provider Attestation - Scribe documentation    All medical record entries made by the Scribe were at my direction and personally dictated by me. I have reviewed the chart and agree that the record accurately reflects my personal performance of the history, physical exam, discussion and plan.     Scribe Attestation  By signing my name below, I, Erika Lerma LPN   attest that this documentation has been prepared under the direction and in the presence of Dorothy Merchant MD.

## 2024-09-20 ENCOUNTER — TELEPHONE (OUTPATIENT)
Dept: CARDIOLOGY | Facility: CLINIC | Age: 64
End: 2024-09-20
Payer: MEDICARE

## 2024-09-20 DIAGNOSIS — I65.23 BILATERAL CAROTID ARTERY STENOSIS: ICD-10-CM

## 2024-09-20 DIAGNOSIS — I10 PRIMARY HYPERTENSION: ICD-10-CM

## 2024-09-20 DIAGNOSIS — I77.9 BILATERAL CAROTID ARTERY DISEASE, UNSPECIFIED TYPE (CMS-HCC): ICD-10-CM

## 2024-09-20 DIAGNOSIS — E78.5 HYPERLIPIDEMIA, UNSPECIFIED HYPERLIPIDEMIA TYPE: ICD-10-CM

## 2024-09-20 RX ORDER — FENOFIBRATE 145 MG/1
145 TABLET, FILM COATED ORAL DAILY
Qty: 90 TABLET | Refills: 1 | Status: SHIPPED | OUTPATIENT
Start: 2024-09-20 | End: 2025-03-19

## 2024-09-20 RX ORDER — IRBESARTAN 75 MG/1
75 TABLET ORAL NIGHTLY
Qty: 90 TABLET | Refills: 1 | Status: SHIPPED | OUTPATIENT
Start: 2024-09-20 | End: 2025-03-19

## 2024-09-20 NOTE — TELEPHONE ENCOUNTER
----- Message from Dorothy Merchant sent at 9/19/2024  7:06 PM EDT -----  Phoebe Hemphill , I was able to review the report of your carotid ultrasound.  On the left side there is slight progression of disease compared to the last ultrasound.    Lets repeat a carotid ultrasound in about 6 months.  It also came to my attention that your kidney function is slightly lower than before.  I am ordering repeat blood work.  Also adding a medicine called Tricor to your cholesterol medicine, to get the bad cholesterol down to 70, it is now in the low 100s.  If you develop any achy muscles please let us know.  Was good seeing you today.

## 2024-09-20 NOTE — TELEPHONE ENCOUNTER
Dorothy Merchant MD  P  [id number] Card1 Clinical Support Staff  Please see response to the carotid ultrasound report and medication changes.  Please order basic metabolic profile diagnosis acute kidney injury    Add Tricor, 145 mg to current regimen, tell patient to report promptly if he were to develop muscle aches and pains that appear like flulike symptoms.    I would reduce his Avapro from 150 mg daily to 75 mg daily    Liver and lipid profile in 3 months    Carotid ultrasound in 6 months    Follow-up in 6 months after carotid ultrasound  Thank you

## 2024-09-20 NOTE — TELEPHONE ENCOUNTER
Informed patient of recommendations. He verbalized understanding. Orders prepped and sent to Dr. Dorothy Merchant MD for signature.     Task sent to  to call and arrange Carotid US once order signed.       Once lab orders are signed please fax to [facility].

## 2024-09-24 NOTE — TELEPHONE ENCOUNTER
Labs printed faxed [place] [facility] and mailed to patient. To [facility] for scheduling Carotid in 6 months.

## 2024-11-17 DIAGNOSIS — I10 PRIMARY HYPERTENSION: ICD-10-CM

## 2024-11-19 NOTE — TELEPHONE ENCOUNTER
"Dr. Mercahnt, it looks like when the patient was in for his appointment our list stated he was taking Irbesartan 150mg, so it was discontinued off of the list.  When the pharmacy requested a refill of this medication it was reflecting the medication was \"discontinued\" because of the dose change. The refill request was originally sent in for 150mg, we made sure the strength was changed to 75mg. Thank you   "

## 2024-11-20 RX ORDER — IRBESARTAN 75 MG/1
75 TABLET ORAL NIGHTLY
Qty: 90 TABLET | Refills: 2 | Status: SHIPPED | OUTPATIENT
Start: 2024-11-20 | End: 2025-11-20

## 2024-11-20 NOTE — TELEPHONE ENCOUNTER
Patient was in for appt and Irbesartan 150mg was taken off of the list. Patient is taking and has been taking 75mg daily. Requesting refill of Irbesartan 75mg once daily to be sent to [facility].     This is why the alert appeared in the note, the pharmacy had the original 150mg strength. Thank you

## 2024-11-20 NOTE — TELEPHONE ENCOUNTER
Dr. Merchant, see note below  Please sign prescription order for Irbesartan 75mg  daily. Thank you

## 2025-03-03 ENCOUNTER — HOSPITAL ENCOUNTER (OUTPATIENT)
Dept: CARDIOLOGY | Facility: CLINIC | Age: 65
Discharge: HOME | End: 2025-03-03
Payer: MEDICARE

## 2025-03-03 DIAGNOSIS — I77.9 BILATERAL CAROTID ARTERY DISEASE, UNSPECIFIED TYPE (CMS-HCC): ICD-10-CM

## 2025-03-03 DIAGNOSIS — I65.23 BILATERAL CAROTID ARTERY STENOSIS: ICD-10-CM

## 2025-03-03 PROCEDURE — 93880 EXTRACRANIAL BILAT STUDY: CPT | Performed by: INTERNAL MEDICINE

## 2025-03-03 PROCEDURE — 93880 EXTRACRANIAL BILAT STUDY: CPT

## 2025-03-16 PROCEDURE — 93306 TTE W/DOPPLER COMPLETE: CPT | Performed by: INTERNAL MEDICINE

## 2025-03-31 ENCOUNTER — APPOINTMENT (OUTPATIENT)
Dept: CARDIOLOGY | Facility: CLINIC | Age: 65
End: 2025-03-31
Payer: MEDICARE

## 2025-03-31 VITALS
WEIGHT: 215 LBS | HEIGHT: 68 IN | HEART RATE: 56 BPM | DIASTOLIC BLOOD PRESSURE: 72 MMHG | BODY MASS INDEX: 32.58 KG/M2 | SYSTOLIC BLOOD PRESSURE: 136 MMHG

## 2025-03-31 DIAGNOSIS — Z87.891 FORMER SMOKER: ICD-10-CM

## 2025-03-31 DIAGNOSIS — E78.5 HYPERLIPIDEMIA, UNSPECIFIED HYPERLIPIDEMIA TYPE: ICD-10-CM

## 2025-03-31 DIAGNOSIS — E78.1 HYPERTRIGLYCERIDEMIA: ICD-10-CM

## 2025-03-31 DIAGNOSIS — Z95.5 HISTORY OF CORONARY ARTERY STENT PLACEMENT: ICD-10-CM

## 2025-03-31 DIAGNOSIS — I10 PRIMARY HYPERTENSION: ICD-10-CM

## 2025-03-31 DIAGNOSIS — I25.10 CORONARY ARTERY DISEASE, UNSPECIFIED VESSEL OR LESION TYPE, UNSPECIFIED WHETHER ANGINA PRESENT, UNSPECIFIED WHETHER NATIVE OR TRANSPLANTED HEART: ICD-10-CM

## 2025-03-31 DIAGNOSIS — R42 VERTIGO: ICD-10-CM

## 2025-03-31 PROCEDURE — 99214 OFFICE O/P EST MOD 30 MIN: CPT | Performed by: INTERNAL MEDICINE

## 2025-03-31 PROCEDURE — 1159F MED LIST DOCD IN RCRD: CPT | Performed by: INTERNAL MEDICINE

## 2025-03-31 PROCEDURE — 3078F DIAST BP <80 MM HG: CPT | Performed by: INTERNAL MEDICINE

## 2025-03-31 PROCEDURE — 3008F BODY MASS INDEX DOCD: CPT | Performed by: INTERNAL MEDICINE

## 2025-03-31 PROCEDURE — 3075F SYST BP GE 130 - 139MM HG: CPT | Performed by: INTERNAL MEDICINE

## 2025-03-31 PROCEDURE — G2211 COMPLEX E/M VISIT ADD ON: HCPCS | Performed by: INTERNAL MEDICINE

## 2025-03-31 PROCEDURE — 1160F RVW MEDS BY RX/DR IN RCRD: CPT | Performed by: INTERNAL MEDICINE

## 2025-03-31 RX ORDER — METOPROLOL TARTRATE 25 MG/1
25 TABLET, FILM COATED ORAL 2 TIMES DAILY
Qty: 180 TABLET | Refills: 2 | Status: SHIPPED | OUTPATIENT
Start: 2025-03-31 | End: 2026-03-31

## 2025-03-31 RX ORDER — ROSUVASTATIN CALCIUM 40 MG/1
40 TABLET, COATED ORAL DAILY
Qty: 90 TABLET | Refills: 2 | Status: SHIPPED | OUTPATIENT
Start: 2025-03-31 | End: 2026-03-31

## 2025-03-31 RX ORDER — MECLIZINE HYDROCHLORIDE 25 MG/1
25 TABLET ORAL 2 TIMES DAILY PRN
COMMUNITY

## 2025-03-31 RX ORDER — IRBESARTAN 75 MG/1
75 TABLET ORAL NIGHTLY
Qty: 90 TABLET | Refills: 2 | Status: SHIPPED | OUTPATIENT
Start: 2025-03-31 | End: 2026-03-31

## 2025-03-31 RX ORDER — AMLODIPINE BESYLATE 10 MG/1
10 TABLET ORAL DAILY
Qty: 90 TABLET | Refills: 2 | Status: SHIPPED | OUTPATIENT
Start: 2025-03-31 | End: 2026-03-31

## 2025-03-31 RX ORDER — FENOFIBRATE 145 MG/1
145 TABLET, FILM COATED ORAL DAILY
Qty: 90 TABLET | Refills: 2 | Status: SHIPPED | OUTPATIENT
Start: 2025-03-31 | End: 2026-03-31

## 2025-03-31 RX ORDER — ASPIRIN 81 MG/1
81 TABLET ORAL DAILY
COMMUNITY

## 2025-03-31 NOTE — PROGRESS NOTES
Chief Complaint:    Chief Complaint   Patient presents with    Follow-up     6 months for coronary artery disease   Accompanied by wife to the office  Seen and released from [facility] 3/17/2025, presented with profound dizziness, woke up from sleep he reports and everything was going around and around, worse with changes in head position.  Diagnosed with vertigo.  Currently on meclizine.  Physical therapy with ENT was recommended.  Dizziness is better.  It is not orthostatic.  Interval review of systems is negative for chest discomfort pressure tightness heaviness palpitations lightheadedness orthopnea paroxysmal nocturnal dyspnea dependent edema or claudication TIA or CVA type symptoms or bleeding diathesis  Subjective :     Review of Systems 12 point review of systems is negative or noncontributory except as noted    History so Far :    1. Symptoms concerning for class III angina pectoris, exertional in nature, however there is an increase in the frequency of symptoms, they are coming on with less activity.  2. Atherosclerotic native vessel coronary artery disease-PCI and stenting of the left anterior descending artery in the proximal mid segment was performed in 2014, patient was noted to be tubular, 3.5 x 28 mm Promus Premier stent was deployed.  3. PCI and stenting of the major obtuse marginal branch of the left circumflex artery was performed, 3.5 x 23 mm Xience expedition stent.  4. LVEF was normal in 2014 estimated LVEF 65% no mitral regurgitation or aortic stenosis  5. RCA was either nondominant or of mixed dominance, diffuse 50 to 70% disease in the proximal third 80 to 90% tubular disease in the mid third, distal RCA supplies a small amount of myocardium, in the distribution of the PDA, the actual PDA was felt to arise from the left circumflex system. RCA is being managed medically.  6. Hyperlipidemia  7. Status post right carotid endarterectomy  8. Glucose  intolerance/prediabetes, hemoglobin A1c January 2023 6.0  9. Sinus bradycardia, incomplete right bundle branch block  10. Rheumatoid arthritis on Humira and as needed prednisone  11. Increased to BMI  12. Lexiscan Myoview 2017 was reported to be normal  13. GERD, quiescent on prophylaxis.  14. Rheumatoid arthritis on as needed prednisone and Humira-chronic inflammatory disease  15. BPH, on tamsulosin.  16. Left heart catheterization February 2023-mixed dominance, large LAD that curves around the left ventricular apex, patent previous stent in the proximal LAD, with less than 20% narrowing, mid LAD 40% stenosis, several diagonal branches with less than 20% stenosis. Large codominant left circumflex artery moderate diffuse calcification healed plaque in the proximal vessel with eccentric 40 to 50% smooth stenosis first major obtuse marginal branch previously deployed stent in its mid segment, 99% stenosis starting at the distal end of the stent with SERGIO-3 distal flow, at the origin of this major obtuse marginal branch and immediately distal to it there is smooth 60% stenosis in the AV groove left circumflex artery distal to that distal branches and terminal circumflex have 20% stenosis RCA codominant proximal vessel 80% mid vessel 100% distal RCA fills by grade 3 collaterals from the left system LVEDP 5 mmHg no systolic gradient across the aortic valve, LVEF 50% no appreciable mitral regurgitation RCA occlusion is now total, prior angiography several years ago severe proximal vessel disease was noted.  17. Patient underwent PCI and stenting of the obtuse marginal branch of the left circumflex artery. 3.5 x 15 mm resolute [place] drug-eluting stent was deployed, and postdilated with a 3.5 mm noncompliant balloon. Angio-Seal hemostatic device was used.  18. Carotid ultrasound June 2023-no hemodynamically significant residual or recurrent stenosis is noted in the right internal carotid artery, 50 to 69% stenosis seen in  "the left extracranial internal carotid artery but closer to the lower end of the spectrum patent vertebral arteries with bilateral antegrade flow although the right vertebral artery waveform is blunted.  19.  Cannot rule out peripheral vascular disease however KYM November 2023-right DP 0.83 right PT 0.91 left PT 0.86 left DP 1.01   20.  Nocturnal muscle cramps, differential diagnosis reviewed, not complaining much of muscle cramping today  21.  Ulcerative colitis  13.  Carotid ultrasound 11/6/2023-50 to 69% stenosis right proximal internal carotid artery less than 50% stenosis left proximal internal carotid artery bilateral antegrade vertebral flow patent right carotid endarterectomy site following endarterectomy  14.  CTA neck March 2025-right carotid endarterectomy changes less than 50% left internal carotid artery stenosis bilateral antegrade vertebral flow.       Objective   Wt Readings from Last 3 Encounters:   03/31/25 97.5 kg (215 lb)   09/19/24 100 kg (221 lb)   02/08/24 100 kg (220 lb 6.4 oz)        Vitals:    03/31/25 0920   BP: 136/72   BP Location: Right arm   Patient Position: Sitting   Pulse: 56   Weight: 97.5 kg (215 lb)   Height: 1.727 m (5' 8\")           Physical Exam:    GENERAL APPEARANCE: in no acute distress.  CHEST: Symmetric and non-tender.  INTEGUMENT: Skin warm and dry  HEENT: Well-healed right carotid endarterectomy scar identified.No pallor or scleral icterus.  NECK: Supple, no JVD, no bruit.   NEURO/PSHCY: Alert and oriented x3; appropriate behavior and responses and responses  LUNGS: Clear to auscultation bilaterally; normal respiratory effort.  HEART: Rate and rhythm regular with no evident murmur; no gallop appreciated.   ABDOMEN: Soft, non tender.  MUSCULOSKELETAL: No gross deformities.  EXTREMITIES: Warm  There is no edema noted.    Meds:  Current Outpatient Medications   Medication Instructions    amLODIPine (NORVASC) 10 mg, oral, Daily    aspirin 81 mg, Daily    cyclobenzaprine " (FLEXERIL) 10 mg, 3 times daily PRN    fenofibrate (TRICOR) 145 mg, oral, Daily    irbesartan (AVAPRO) 75 mg, oral, Nightly    meclizine (ANTIVERT) 25 mg, 2 times daily PRN    metFORMIN (GLUCOPHAGE) 1,000 mg, 2 times daily (morning and late afternoon)    metoprolol tartrate (LOPRESSOR) 25 mg, oral, 2 times daily    nitroglycerin (NITROSTAT) 0.4 mg, Every 5 min PRN    omeprazole OTC (PRILOSEC OTC) 20 mg, 2 times daily before meals    predniSONE (DELTASONE) 20 mg, Daily    rosuvastatin (CRESTOR) 40 mg, oral, Daily    sulfaSALAzine (AZULFIDINE) 1,000 mg, 4 times daily    ticagrelor (BRILINTA) 90 mg, oral, 2 times daily    traMADol (ULTRAM) 50 mg, Every 6 hours PRN          No Known Allergies      LABS: Following lab results were reviewed.  March 2025-hemoglobin 12.3 hematocrit 35.9 MCV 89 platelets 2 43,000 sodium 139 potassium 3.8 BUN 18 creatinine 1.1 GFR greater than 60 glucose 105 hemoglobin A1c 6.0 liver enzymes normal total cholesterol 157 LDL 76 triglycerides 200 HDL 41 total cholesterol to HDL ratio 3.8 TSH 1.84    Reviewed all available pertinent laboratory data and diagnostic testing results that occurred after the last office visit with me                Assessment:    1. Coronary artery disease, unspecified vessel or lesion type, unspecified whether angina present, unspecified whether native or transplanted heart  Follow Up In Cardiology      2. Primary hypertension        3. History of coronary artery stent placement        4. Hypertriglyceridemia        5. Hyperlipidemia, unspecified hyperlipidemia type        6. Vertigo        7. BMI 32.0-32.9,adult        8. Former smoker             Clinical Decision Making: Patient is doing well from cardiac perspective.  Ongoing risk factor modification is recommended.  Blood pressure is at target lipid profile is at target no progression of carotid artery disease.  Chronic inflammatory bowel disease for which he is on therapy.  Non-insulin-dependent  diabetes-probably related to long-term use of prednisone, well-controlled, no symptoms of hypoglycemia.    Follow up : 6 months    IRhianna LPN am scribing for, and in the presence of Dr. Dorothy Merchant MD, FACC.       I, Dr. Dorothy Merchant MD, FACC, personally performed the services described in the documentation as scribed by Rhianna WALLACE LPN  in my presence, and confirm it is both accurate and complete.

## 2025-03-31 NOTE — LETTER
April 1, 2025     Julio Cesar Casillas DO  [address] [facility] [address]    Patient: Joby Rodrigues   YOB: 1960   Date of Visit: 3/31/2025       Dear Dr. Julio Cesar Casillas DO:    Thank you for referring Joby Rodrigues to me for evaluation. Below are my notes for this consultation.  If you have questions, please do not hesitate to call me. I look forward to following your patient along with you.       Sincerely,     Dorothy Merchant MD      CC: No Recipients  ______________________________________________________________________________________        Chief Complaint:    Chief Complaint   Patient presents with   • Follow-up     6 months for coronary artery disease   Accompanied by wife to the office  Seen and released from [facility] 3/17/2025, presented with profound dizziness, woke up from sleep he reports and everything was going around and around, worse with changes in head position.  Diagnosed with vertigo.  Currently on meclizine.  Physical therapy with ENT was recommended.  Dizziness is better.  It is not orthostatic.  Interval review of systems is negative for chest discomfort pressure tightness heaviness palpitations lightheadedness orthopnea paroxysmal nocturnal dyspnea dependent edema or claudication TIA or CVA type symptoms or bleeding diathesis  Subjective :     Review of Systems 12 point review of systems is negative or noncontributory except as noted    History so Far :    1. Symptoms concerning for class III angina pectoris, exertional in nature, however there is an increase in the frequency of symptoms, they are coming on with less activity.  2. Atherosclerotic native vessel coronary artery disease-PCI and stenting of the left anterior descending artery in the proximal mid segment was performed in 2014, patient was noted to be tubular, 3.5 x 28 mm Promus Premier stent was deployed.  3. PCI and stenting of the major obtuse marginal branch of the left circumflex artery was  performed, 3.5 x 23 mm Xience expedition stent.  4. LVEF was normal in 2014 estimated LVEF 65% no mitral regurgitation or aortic stenosis  5. RCA was either nondominant or of mixed dominance, diffuse 50 to 70% disease in the proximal third 80 to 90% tubular disease in the mid third, distal RCA supplies a small amount of myocardium, in the distribution of the PDA, the actual PDA was felt to arise from the left circumflex system. RCA is being managed medically.  6. Hyperlipidemia  7. Status post right carotid endarterectomy  8. Glucose intolerance/prediabetes, hemoglobin A1c January 2023 6.0  9. Sinus bradycardia, incomplete right bundle branch block  10. Rheumatoid arthritis on Humira and as needed prednisone  11. Increased to BMI  12. Lexiscan Myoview 2017 was reported to be normal  13. GERD, quiescent on prophylaxis.  14. Rheumatoid arthritis on as needed prednisone and Humira-chronic inflammatory disease  15. BPH, on tamsulosin.  16. Left heart catheterization February 2023-mixed dominance, large LAD that curves around the left ventricular apex, patent previous stent in the proximal LAD, with less than 20% narrowing, mid LAD 40% stenosis, several diagonal branches with less than 20% stenosis. Large codominant left circumflex artery moderate diffuse calcification healed plaque in the proximal vessel with eccentric 40 to 50% smooth stenosis first major obtuse marginal branch previously deployed stent in its mid segment, 99% stenosis starting at the distal end of the stent with SERGIO-3 distal flow, at the origin of this major obtuse marginal branch and immediately distal to it there is smooth 60% stenosis in the AV groove left circumflex artery distal to that distal branches and terminal circumflex have 20% stenosis RCA codominant proximal vessel 80% mid vessel 100% distal RCA fills by grade 3 collaterals from the left system LVEDP 5 mmHg no systolic gradient across the aortic valve, LVEF 50% no appreciable mitral  "regurgitation RCA occlusion is now total, prior angiography several years ago severe proximal vessel disease was noted.  17. Patient underwent PCI and stenting of the obtuse marginal branch of the left circumflex artery. 3.5 x 15 mm resolute [place] drug-eluting stent was deployed, and postdilated with a 3.5 mm noncompliant balloon. Angio-Seal hemostatic device was used.  18. Carotid ultrasound June 2023-no hemodynamically significant residual or recurrent stenosis is noted in the right internal carotid artery, 50 to 69% stenosis seen in the left extracranial internal carotid artery but closer to the lower end of the spectrum patent vertebral arteries with bilateral antegrade flow although the right vertebral artery waveform is blunted.  19.  Cannot rule out peripheral vascular disease however KYM November 2023-right DP 0.83 right PT 0.91 left PT 0.86 left DP 1.01   20.  Nocturnal muscle cramps, differential diagnosis reviewed, not complaining much of muscle cramping today  21.  Ulcerative colitis  13.  Carotid ultrasound 11/6/2023-50 to 69% stenosis right proximal internal carotid artery less than 50% stenosis left proximal internal carotid artery bilateral antegrade vertebral flow patent right carotid endarterectomy site following endarterectomy  14.  CTA neck March 2025-right carotid endarterectomy changes less than 50% left internal carotid artery stenosis bilateral antegrade vertebral flow.       Objective   Wt Readings from Last 3 Encounters:   03/31/25 97.5 kg (215 lb)   09/19/24 100 kg (221 lb)   02/08/24 100 kg (220 lb 6.4 oz)        Vitals:    03/31/25 0920   BP: 136/72   BP Location: Right arm   Patient Position: Sitting   Pulse: 56   Weight: 97.5 kg (215 lb)   Height: 1.727 m (5' 8\")           Physical Exam:    GENERAL APPEARANCE: in no acute distress.  CHEST: Symmetric and non-tender.  INTEGUMENT: Skin warm and dry  HEENT: Well-healed right carotid endarterectomy scar identified.No pallor or scleral " icterus.  NECK: Supple, no JVD, no bruit.   NEURO/PSHCY: Alert and oriented x3; appropriate behavior and responses and responses  LUNGS: Clear to auscultation bilaterally; normal respiratory effort.  HEART: Rate and rhythm regular with no evident murmur; no gallop appreciated.   ABDOMEN: Soft, non tender.  MUSCULOSKELETAL: No gross deformities.  EXTREMITIES: Warm  There is no edema noted.    Meds:  Current Outpatient Medications   Medication Instructions   • amLODIPine (NORVASC) 10 mg, oral, Daily   • aspirin 81 mg, Daily   • cyclobenzaprine (FLEXERIL) 10 mg, 3 times daily PRN   • fenofibrate (TRICOR) 145 mg, oral, Daily   • irbesartan (AVAPRO) 75 mg, oral, Nightly   • meclizine (ANTIVERT) 25 mg, 2 times daily PRN   • metFORMIN (GLUCOPHAGE) 1,000 mg, 2 times daily (morning and late afternoon)   • metoprolol tartrate (LOPRESSOR) 25 mg, oral, 2 times daily   • nitroglycerin (NITROSTAT) 0.4 mg, Every 5 min PRN   • omeprazole OTC (PRILOSEC OTC) 20 mg, 2 times daily before meals   • predniSONE (DELTASONE) 20 mg, Daily   • rosuvastatin (CRESTOR) 40 mg, oral, Daily   • sulfaSALAzine (AZULFIDINE) 1,000 mg, 4 times daily   • ticagrelor (BRILINTA) 90 mg, oral, 2 times daily   • traMADol (ULTRAM) 50 mg, Every 6 hours PRN          No Known Allergies      LABS: Following lab results were reviewed.  March 2025-hemoglobin 12.3 hematocrit 35.9 MCV 89 platelets 2 43,000 sodium 139 potassium 3.8 BUN 18 creatinine 1.1 GFR greater than 60 glucose 105 hemoglobin A1c 6.0 liver enzymes normal total cholesterol 157 LDL 76 triglycerides 200 HDL 41 total cholesterol to HDL ratio 3.8 TSH 1.84    Reviewed all available pertinent laboratory data and diagnostic testing results that occurred after the last office visit with me                Assessment:    1. Coronary artery disease, unspecified vessel or lesion type, unspecified whether angina present, unspecified whether native or transplanted heart  Follow Up In Cardiology      2. Primary  hypertension        3. History of coronary artery stent placement        4. Hypertriglyceridemia        5. Hyperlipidemia, unspecified hyperlipidemia type        6. Vertigo        7. BMI 32.0-32.9,adult        8. Former smoker             Clinical Decision Making: Patient is doing well from cardiac perspective.  Ongoing risk factor modification is recommended.  Blood pressure is at target lipid profile is at target no progression of carotid artery disease.  Chronic inflammatory bowel disease for which he is on therapy.  Non-insulin-dependent diabetes-probably related to long-term use of prednisone, well-controlled, no symptoms of hypoglycemia.    Follow up : 6 months    IRhianna LPN am scribing for, and in the presence of Dr. Dorothy Merchant MD, FACC.       I, Dr. Dorothy Merchant MD, FACC, personally performed the services described in the documentation as scribed by Rhianna WALLACE LPN  in my presence, and confirm it is both accurate and complete.

## 2025-03-31 NOTE — PATIENT INSTRUCTIONS
Please bring all medicines, vitamins, and herbal supplements with you when you come to the office.    Prescriptions will not be filled unless you are compliant with your follow up appointments or have a follow up appointment scheduled as per instruction of your physician. Refills should be requested at the time of your visit.   BMI was above normal measurement. Current weight: 97.5 kg (215 lb)  Weight change since last visit (-) denotes wt loss -6 lbs   Weight loss needed to achieve BMI 25: 50.9 Lbs  Weight loss needed to achieve BMI 30: 18.1 Lbs  Provided instructions on dietary changes  Provided instructions on exercise.

## 2025-06-25 ENCOUNTER — TELEPHONE (OUTPATIENT)
Dept: CARDIOLOGY | Facility: CLINIC | Age: 65
End: 2025-06-25
Payer: MEDICARE

## 2025-09-29 ENCOUNTER — APPOINTMENT (OUTPATIENT)
Dept: CARDIOLOGY | Facility: CLINIC | Age: 65
End: 2025-09-29
Payer: MEDICARE